# Patient Record
Sex: MALE | Race: WHITE | NOT HISPANIC OR LATINO | URBAN - METROPOLITAN AREA
[De-identification: names, ages, dates, MRNs, and addresses within clinical notes are randomized per-mention and may not be internally consistent; named-entity substitution may affect disease eponyms.]

---

## 2017-07-13 ENCOUNTER — OFFICE VISIT - RIVER FALLS (OUTPATIENT)
Dept: FAMILY MEDICINE | Facility: CLINIC | Age: 27
End: 2017-07-13

## 2017-07-13 ASSESSMENT — MIFFLIN-ST. JEOR: SCORE: 1982.51

## 2022-02-12 VITALS
DIASTOLIC BLOOD PRESSURE: 60 MMHG | BODY MASS INDEX: 29.93 KG/M2 | HEIGHT: 72 IN | SYSTOLIC BLOOD PRESSURE: 122 MMHG | WEIGHT: 221 LBS | HEART RATE: 68 BPM

## 2022-02-15 NOTE — PROGRESS NOTES
Patient:   AURE MURRAY            MRN: [id number]            FIN: [id number]               Age:   27 years     Sex:  Male     :  1990   Associated Diagnoses:   ADHD   Author:   Jeffry Maldonado PA-C      Visit Information      Date of Service: 2017 08:23 am  Performing Location: [facility]  Encounter#: [id number]      Primary Care Provider (PCP):  Delmar Trent MD    NPI# [id number]      Referring Provider:  Jeffry Maldonado PA-C    NPI# [id number]   Visit type:  Scheduled follow-up.    Accompanied by:  No one.    Source of history:  Self.    Referral source:  Self.    History limitation:  None.       Chief Complaint   2017 4:49 PM CDT    Pt. here for med ck        History of Present Illness             The patient presents Attention Deficit Discorder.  Exacerbating factors consist of none.  Relieving factors consist of counseling and medication.  Associated symptoms consist of none.  Additional pertinent history: none.  Now  and living in [place].  Work more stressful. Supervisory position..        Review of Systems   Constitutional:  Negative.    Respiratory:  Negative.    Cardiovascular:  Negative.    Neurologic:  Negative, No tics.    Psychiatric:  Negative except as documented in history of present illness.       Health Status   Allergies:    Allergic Reactions (Selected)  Severity Not Documented  Amoxicillin (Hives)  Duricef (Rash)   Medications:  (Selected)   Prescriptions  Prescribed  Adderall XR 20 mg oral capsule, extended release: 3 cap(s) ( 60 mg ), PO, qAM, # 90 cap(s), 0 Refill(s), Type: Maintenance, [id number], 3 cap(s) po qam  ProAir HFA 90 mcg/inh inhalation aerosol: 2 puff(s), INH, QID, # 1 EA, 5 Refill(s), Type: Maintenance, Pharmacy: [facility], 2 puff(s) inh qid   Problem list:    All Problems  Allergic Rhinitis / ICD-9-.9 / Confirmed  Anxiety / ICD-9-.00 / Confirmed  Asthma / ICD-9-.90 / Confirmed  ADHD  / SNOMED CT 19203022 / Confirmed  Depression, major, recurrent, moderate / ICD-9-.32 / Confirmed  Insomnia / ICD-9-.52 / Confirmed  Obesity / ICD-9-.00 / Probable      Histories   Past Medical History:    Active  Allergic Rhinitis (477.9)  ADHD (30366222)  Asthma (493.90)  Comments:  9/27/2010 CDT 4:55 PM CDT - Gomez CMA, Gayle  EXERCISE INDUCED  Anxiety (300.00)  Depression, major, recurrent, moderate (296.32)  Insomnia (780.52)   Family History:    Cancer  Father  Hypertension  Grandfather (M)  Heart disease  Grandfather (M)  Grandfather (P)     Procedure history:    Colonoscopy (45.23) on 4/30/2013 at 22 Years.  Comments:  2/18/2016 1:32 PM - Mila Salazar  Impression:  Normal.  NASAL LACRIMAL DUCT OBSTRUCTION in 1990 at 11 Months.   Social History:        Alcohol Assessment: Current      Tobacco Assessment: Current      Substance Abuse Assessment: Denies Substance Abuse      Home and Environment Assessment            Marital status: Single.  Spouse/Partner name: Vesna.      Exercise and Physical Activity Assessment: Occasional exercise        Physical Examination   Vital Signs   7/13/2017 4:49 PM CDT Peripheral Pulse Rate 68 bpm    Pulse Site Radial artery    HR Method Manual    Systolic Blood Pressure 122 mmHg    Diastolic Blood Pressure 60 mmHg    Mean Arterial Pressure 81 mmHg    BP Site Left arm    BP Method Manual      Measurements from flowsheet : Measurements   7/13/2017 4:49 PM CDT Height Measured - Standard 71.5 in    Weight Measured - Standard 221.0 lb    BSA 2.25 m2    Body Mass Index 30.39 kg/m2      General:  Alert and oriented X 3, No acute distress, Warm, Pink.         Appearance: Within normal limits, Well nourished, Calm, Well developed.         Hydration: Within normal limits.         Psych: Within normal limits, Appropriate mood and affect, Cooperative, Normal judgment.    Respiratory:  Lungs are clear to auscultation, Respirations are non-labored.    Cardiovascular:  Normal  rate, Regular rhythm.       Impression and Plan   Diagnosis     ADHD ([id number] F90.0).     Patient Instructions:       Counseled: Patient, Regarding diagnosis, Regarding treatment, Regarding medications, Activity.    Orders     Orders (Selected)   Prescriptions  Prescribed  Adderall XR 20 mg oral capsule, extended release: 3 cap(s) ( 60 mg ), PO, qAM, # 90 cap(s), 0 Refill(s), Type: Maintenance, Pharmacy: [id number], 3 cap(s) po qam.     CSA updated.